# Patient Record
Sex: FEMALE | Employment: PART TIME | URBAN - METROPOLITAN AREA
[De-identification: names, ages, dates, MRNs, and addresses within clinical notes are randomized per-mention and may not be internally consistent; named-entity substitution may affect disease eponyms.]

---

## 2017-06-12 ENCOUNTER — HOSPITAL ENCOUNTER (OUTPATIENT)
Dept: LAB | Facility: CLINIC | Age: 32
Discharge: HOME | End: 2017-06-12
Attending: UROLOGY | Admitting: UROLOGY
Payer: COMMERCIAL

## 2017-06-12 DIAGNOSIS — N39.0 URINARY TRACT INFECTION: Primary | ICD-10-CM

## 2017-06-12 PROCEDURE — 87086 URINE CULTURE/COLONY COUNT: CPT | Performed by: UROLOGY

## 2017-06-13 LAB
BACTERIA SPEC CULT: NORMAL
Lab: NORMAL
MICRO REPORT STATUS: NORMAL
SPECIMEN SOURCE: NORMAL

## 2019-06-11 DIAGNOSIS — O09.291 HX OF POSTPARTUM HEMORRHAGE, CURRENTLY PREGNANT, FIRST TRIMESTER: ICD-10-CM

## 2019-06-11 RX ORDER — HYDROXYPROGESTERONE CAPROATE 250 MG/ML
250 INJECTION INTRAMUSCULAR
Status: CANCELLED
Start: 2019-07-09

## 2019-07-09 ENCOUNTER — INFUSION THERAPY VISIT (OUTPATIENT)
Dept: INFUSION THERAPY | Facility: CLINIC | Age: 34
End: 2019-07-09
Attending: SPECIALIST
Payer: COMMERCIAL

## 2019-07-09 VITALS
HEART RATE: 77 BPM | DIASTOLIC BLOOD PRESSURE: 68 MMHG | RESPIRATION RATE: 18 BRPM | SYSTOLIC BLOOD PRESSURE: 109 MMHG | TEMPERATURE: 98.4 F

## 2019-07-09 DIAGNOSIS — O09.291 HX OF POSTPARTUM HEMORRHAGE, CURRENTLY PREGNANT, FIRST TRIMESTER: Primary | ICD-10-CM

## 2019-07-09 PROCEDURE — 96372 THER/PROPH/DIAG INJ SC/IM: CPT

## 2019-07-09 PROCEDURE — 25000128 H RX IP 250 OP 636: Performed by: SPECIALIST

## 2019-07-09 RX ORDER — HYDROXYPROGESTERONE CAPROATE 250 MG/ML
250 INJECTION INTRAMUSCULAR
Status: DISCONTINUED | OUTPATIENT
Start: 2019-07-09 | End: 2019-07-09 | Stop reason: HOSPADM

## 2019-07-09 RX ORDER — HYDROXYPROGESTERONE CAPROATE 250 MG/ML
250 INJECTION INTRAMUSCULAR
Status: CANCELLED
Start: 2019-07-16

## 2019-07-09 RX ADMIN — HYDROXYPROGESTERONE CAPROATE 250 MG: 250 INJECTION INTRAMUSCULAR at 15:39

## 2019-07-09 ASSESSMENT — PAIN SCALES - GENERAL: PAINLEVEL: NO PAIN (0)

## 2019-07-09 NOTE — PROGRESS NOTES
Infusion Nursing Note:  Brigida Rose presents today for Irma.    Patient seen by provider today: No   present during visit today: Not Applicable.    Note: N/A.    Intravenous Access:  No Intravenous access/labs at this visit.    Treatment Conditions:  Not Applicable.      Post Infusion Assessment:  Patient tolerated injection without incident.       Discharge Plan:   Discharge instructions reviewed with: Patient.  Patient and/or family verbalized understanding of discharge instructions and all questions answered.  Copy of AVS reviewed with patient and/or family.  Patient will return 7/16*/19 for next appointment.  Patient discharged in stable condition accompanied by: self.  Departure Mode: Ambulatory.    Allison Mauricio RN

## 2023-05-18 ENCOUNTER — ANCILLARY PROCEDURE (OUTPATIENT)
Dept: ULTRASOUND IMAGING | Facility: CLINIC | Age: 38
End: 2023-05-18
Attending: SPECIALIST
Payer: COMMERCIAL

## 2023-05-18 DIAGNOSIS — M79.89 SWELLING OF LOWER LEG: ICD-10-CM

## 2023-05-18 PROCEDURE — 93971 EXTREMITY STUDY: CPT | Mod: RT

## 2023-09-20 ENCOUNTER — OFFICE VISIT (OUTPATIENT)
Dept: FAMILY MEDICINE | Facility: CLINIC | Age: 38
End: 2023-09-20

## 2023-09-20 VITALS
OXYGEN SATURATION: 98 % | HEIGHT: 66 IN | WEIGHT: 171 LBS | HEART RATE: 64 BPM | BODY MASS INDEX: 27.48 KG/M2 | DIASTOLIC BLOOD PRESSURE: 64 MMHG | SYSTOLIC BLOOD PRESSURE: 113 MMHG

## 2023-09-20 DIAGNOSIS — M79.89 RIGHT LEG SWELLING: Primary | ICD-10-CM

## 2023-09-20 DIAGNOSIS — J45.20 MILD INTERMITTENT ASTHMA WITHOUT COMPLICATION: ICD-10-CM

## 2023-09-20 PROBLEM — O09.291: Status: RESOLVED | Noted: 2019-06-11 | Resolved: 2023-09-20

## 2023-09-20 PROCEDURE — 99203 OFFICE O/P NEW LOW 30 MIN: CPT | Performed by: FAMILY MEDICINE

## 2023-09-20 RX ORDER — ESCITALOPRAM OXALATE 10 MG/1
TABLET ORAL
COMMUNITY
Start: 2023-09-18

## 2023-09-20 RX ORDER — NORETHINDRONE 0.35 MG/1
TABLET ORAL
COMMUNITY

## 2023-09-20 RX ORDER — ALBUTEROL SULFATE 90 UG/1
AEROSOL, METERED RESPIRATORY (INHALATION)
COMMUNITY
Start: 2023-08-23

## 2023-09-20 NOTE — PATIENT INSTRUCTIONS
[facility]:    Chata Nguyễn MD, MS, DABVLM, [facility]  [facility]  [facility]  [facility]  [facility]  [facility]

## 2023-09-20 NOTE — PROGRESS NOTES
"SUBJECTIVE:    Brigida Rose, is a 38 year old female presenting to [facility] care ( referred by Obgyn) and for the below:     1. Exercise induced asthma. Albuterol PRN. Occasional viral URI symptoms. H/o sinus infections.     2. Right lower extremity swelling. Onset during pregnancy (bed rest from 13 to 20 weeks for subchorionic bleed). Doppler USS right lower extremity at 34 weeks within normal limits. No resolution post partum (13 weeks now). Swelling improves overnight, worsens during the day.  Has tried compression stocking both during and after pregnancy with minimal improvement in symptoms/swelling for a duration over 6 weeks.       OBJECTIVE:  Vitals:    09/20/23 1008   BP: 113/64   Pulse: 64   SpO2: 98%   Weight: 77.6 kg (171 lb)   Height: 1.67 m (5' 5.75\")    Body mass index is 27.81 kg/m .  General: no acute distress, cooperative with exam.  Lungs: clear to auscultation bilaterally, normal respiratory effort.  Heart: regular rate, normal S1 and S2, no murmurs.   Abdomen: non tender, no palpable organomegaly.  Extremities: warm, perfused, trace edema right lower extremity. No obvious superficial tortuous varicosities       ASSESSMENT / PLAN:      Right leg swelling  Suspect related to impeded IVC return during most recent pregnancy with extended bed rest. Referral to vascular medicine for consideration of vascular flow imaging / next steps. Has tried compression stocking with minimal improvement in symptoms/swelling for a duration over 6 weeks.   -     Vascular Medicine Referral; Future    Mild intermittent asthma without complication  Stable.     Last pap: Spring 2022 -  Keven signed   "

## 2023-10-31 ENCOUNTER — TRANSFERRED RECORDS (OUTPATIENT)
Dept: FAMILY MEDICINE | Facility: CLINIC | Age: 38
End: 2023-10-31

## 2023-11-19 ENCOUNTER — HEALTH MAINTENANCE LETTER (OUTPATIENT)
Age: 38
End: 2023-11-19

## 2024-02-01 ENCOUNTER — TELEPHONE (OUTPATIENT)
Dept: FAMILY MEDICINE | Facility: CLINIC | Age: 39
End: 2024-02-01

## 2024-02-01 DIAGNOSIS — Z29.9 PROPHYLACTIC MEASURE: Primary | ICD-10-CM

## 2024-02-01 RX ORDER — OSELTAMIVIR PHOSPHATE 75 MG/1
75 CAPSULE ORAL DAILY
Qty: 7 CAPSULE | Refills: 0 | Status: SHIPPED | OUTPATIENT
Start: 2024-02-01 | End: 2024-02-08

## 2024-02-01 NOTE — TELEPHONE ENCOUNTER
Patient calls clinic and states her  tested positive for Influenza B on Tuesday 1/30/24.  She is wondering if Dr. 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  She has taken it before with no issues, and currently is not symptomatic, other than occasional headaches.  Her children were also prescribed Tamiflu.  She would like rx sent to [facility] in [place].       Routing to Brooklyn Vann MD for review and will call patient back with update.  Debbie Lopez LPN on 2/1/2024 at 10:14 AM

## 2024-05-23 ENCOUNTER — OFFICE VISIT (OUTPATIENT)
Dept: FAMILY MEDICINE | Facility: CLINIC | Age: 39
End: 2024-05-23

## 2024-05-23 VITALS
SYSTOLIC BLOOD PRESSURE: 104 MMHG | DIASTOLIC BLOOD PRESSURE: 78 MMHG | HEIGHT: 67 IN | HEART RATE: 60 BPM | BODY MASS INDEX: 26.78 KG/M2 | OXYGEN SATURATION: 99 %

## 2024-05-23 DIAGNOSIS — J02.9 ACUTE PHARYNGITIS, UNSPECIFIED ETIOLOGY: ICD-10-CM

## 2024-05-23 DIAGNOSIS — J01.40 ACUTE NON-RECURRENT PANSINUSITIS: Primary | ICD-10-CM

## 2024-05-23 LAB — S PYO AG THROAT QL IA.RAPID: NEGATIVE

## 2024-05-23 PROCEDURE — 99213 OFFICE O/P EST LOW 20 MIN: CPT

## 2024-05-23 PROCEDURE — 87880 STREP A ASSAY W/OPTIC: CPT

## 2024-05-23 PROCEDURE — G2211 COMPLEX E/M VISIT ADD ON: HCPCS

## 2024-05-23 RX ORDER — BENZONATATE 100 MG/1
100 CAPSULE ORAL 3 TIMES DAILY PRN
Qty: 21 CAPSULE | Refills: 0 | Status: CANCELLED | OUTPATIENT
Start: 2024-05-23

## 2024-05-23 ASSESSMENT — ANXIETY QUESTIONNAIRES
5. BEING SO RESTLESS THAT IT IS HARD TO SIT STILL: NOT AT ALL
1. FEELING NERVOUS, ANXIOUS, OR ON EDGE: SEVERAL DAYS
GAD7 TOTAL SCORE: 4
6. BECOMING EASILY ANNOYED OR IRRITABLE: MORE THAN HALF THE DAYS
IF YOU CHECKED OFF ANY PROBLEMS ON THIS QUESTIONNAIRE, HOW DIFFICULT HAVE THESE PROBLEMS MADE IT FOR YOU TO DO YOUR WORK, TAKE CARE OF THINGS AT HOME, OR GET ALONG WITH OTHER PEOPLE: SOMEWHAT DIFFICULT
GAD7 TOTAL SCORE: 4
2. NOT BEING ABLE TO STOP OR CONTROL WORRYING: NOT AT ALL
3. WORRYING TOO MUCH ABOUT DIFFERENT THINGS: SEVERAL DAYS
7. FEELING AFRAID AS IF SOMETHING AWFUL MIGHT HAPPEN: NOT AT ALL

## 2024-05-23 ASSESSMENT — PATIENT HEALTH QUESTIONNAIRE - PHQ9
5. POOR APPETITE OR OVEREATING: NOT AT ALL
SUM OF ALL RESPONSES TO PHQ QUESTIONS 1-9: 3

## 2024-05-23 NOTE — PROGRESS NOTES
"  Assessment & Plan     Acute non-recurrent pansinusitis  Discussed that symptoms are likely viral etiology at this time and lack of current indication for Abx discussed.  After discussion, patient preference is for delayed Abx script : will collect if symptoms persist without improvement over the next 48-72 hours. Rx for Augmentin.   Discussed main side effects from antibiotics can be GI upset, loose stools, etc   Recommend taking with food to help reduce GI complications from antibiotic use.  Call if any serious diarrhea develops within the next several weeks of taking this antibiotics.   Recommended symptomatic management such as steam inhalation, steroid nasal spray for next few weeks, OTC analgesics, and/or nasal/sinus lavage with Netti pot or Sinus Rinse Kit. Follow up if symptoms worsen or failure to improve. Patient agreeable to plan   - amoxicillin-clavulanate (AUGMENTIN) 875-125 MG tablet  Dispense: 14 tablet; Refill: 0    Acute pharyngitis, unspecified etiology  Rapid strep was negative. No culture sent. Sore throat recommendations include salt water gargles, OTC throat lozenges, tea/tea with honey and Tylenol/Ibuprofen as needed for pain/fever. Follow up for new or worsening symptoms. Patient agreeable with plan. All questions answered.  - Rapid Strep ([facility])            BMI  Estimated body mass index is 26.78 kg/m  as calculated from the following:    Height as of this encounter: 1.702 m (5' 7\").    Weight as of 9/20/23: 77.6 kg (171 lb).   Weight management plan: Discussed healthy diet and exercise guidelines      Work on weight loss  Regular exercise  See Patient Instructions    Return if symptoms worsen or fail to improve, for Follow up.    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 More PND  Intensity: moderate  Progression of Symptoms:  improving sore throat, but overall worse with coughing  Accompanying Signs & Symptoms: Sore throat, congestion/runny nose, headache, cough  Previous history of similar problem: Been a couple years but prone to bronchitis   Precipitating factors:        Worsened by: talking  Alleviating factors:        Improved by: allergy medication, steamy shower  Therapies tried and outcome: Flonase and allergy medication (zyrtec)  Exposure to strep throat       Review of Systems  Constitutional, HEENT, cardiovascular, pulmonary, gi and gu systems are negative, except as otherwise noted.      Objective    /78   Pulse 60   Ht 1.702 m (5' 7\")   SpO2 99%   BMI 26.78 kg/m    Body mass index is 26.78 kg/m .  Physical Exam   GENERAL: alert and no distress  HENT: normal cephalic/atraumatic, ear canals and TM's normal, nose and mouth without ulcers or lesions, nasal mucosa edematous , oropharynx clear, oral mucous membranes moist, tonsillar erythema, and sinuses: maxillary, frontal tenderness on both sides  NECK: no adenopathy  RESP: lungs clear to auscultation - no rales, rhonchi or wheezes  CV: regular rate and rhythm, normal S1 S2, no S3 or S4, no murmur, click or rub, no peripheral edema  PSYCH: mentation appears normal, affect normal/bright    Results for orders placed or performed in visit on 05/23/24 (from the past 24 hour(s))   Rapid Strep ([facility])   Result Value Ref Range    Rapid Strep A Screen Negative            Signed Electronically by: Laney Jaimes, RENNY CNP    "

## 2024-06-19 ENCOUNTER — OFFICE VISIT (OUTPATIENT)
Dept: FAMILY MEDICINE | Facility: CLINIC | Age: 39
End: 2024-06-19

## 2024-06-19 VITALS
HEIGHT: 66 IN | DIASTOLIC BLOOD PRESSURE: 79 MMHG | OXYGEN SATURATION: 100 % | WEIGHT: 147 LBS | BODY MASS INDEX: 23.63 KG/M2 | HEART RATE: 57 BPM | SYSTOLIC BLOOD PRESSURE: 128 MMHG

## 2024-06-19 DIAGNOSIS — A04.72 CLOSTRIDIUM DIFFICILE COLITIS: Primary | ICD-10-CM

## 2024-06-19 PROCEDURE — 99213 OFFICE O/P EST LOW 20 MIN: CPT | Performed by: FAMILY MEDICINE

## 2024-06-19 PROCEDURE — G2211 COMPLEX E/M VISIT ADD ON: HCPCS | Performed by: FAMILY MEDICINE

## 2024-06-19 RX ORDER — CRANBERRY FRUIT EXTRACT 250 MG
CAPSULE ORAL
COMMUNITY

## 2024-06-19 RX ORDER — VANCOMYCIN HYDROCHLORIDE 125 MG/1
125 CAPSULE ORAL 4 TIMES DAILY
Qty: 40 CAPSULE | Refills: 0 | Status: SHIPPED | OUTPATIENT
Start: 2024-06-19 | End: 2024-06-29

## 2024-06-19 NOTE — PROGRESS NOTES
"SUBJECTIVE:    Brigida Rose, is a 38 year old female presenting for the below:     1. Household contact (4 year old daughter) with C diff colitis :diagnosed 2 weeks ago: Daughters symptoms started 5/26 : now on Abx treatment.   Patient recently completed Abx course for URI : started 5/26. 3 loose stools (some watery) this am with mild abdominal discomfort.  Denies decreased appetite, nausea, vomiting. No fevers or chills.     OBJECTIVE:  Vitals:    06/19/24 1021   BP: 128/79   Pulse: 57   SpO2: 100%   Weight: 66.7 kg (147 lb)   Height: 1.676 m (5' 6\")    Body mass index is 23.73 kg/m .  General: no acute distress, cooperative with exam.  Psych: mental status normal, mood and affect appropriate.      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   Can stop vanco if stool culture negative.   Of note, patient breastfeeding : reviewed minimal amounts in breast milk as vancomycin not absorbed through GI tract  -     C. difficile Toxin B PCR with reflex to C. difficile Antigen and Toxins A/B EIA; Future  -     vancomycin (VANCOCIN) 125 MG capsule; Take 1 capsule (125 mg) by mouth 4 times daily for 10 days        "

## 2024-06-21 ENCOUNTER — MYC MEDICAL ADVICE (OUTPATIENT)
Dept: FAMILY MEDICINE | Facility: CLINIC | Age: 39
End: 2024-06-21

## 2024-06-21 DIAGNOSIS — A04.72 CLOSTRIDIUM DIFFICILE COLITIS: ICD-10-CM

## 2024-06-21 PROCEDURE — 87493 C DIFF AMPLIFIED PROBE: CPT | Mod: ORL | Performed by: FAMILY MEDICINE

## 2024-06-22 LAB — C DIFF TOX B STL QL: NEGATIVE

## 2024-06-28 ENCOUNTER — TRANSFERRED RECORDS (OUTPATIENT)
Dept: FAMILY MEDICINE | Facility: CLINIC | Age: 39
End: 2024-06-28

## 2024-06-28 LAB
CHOLESTEROL (EXTERNAL): 192 MG/DL (ref 0–199)
HDLC SERPL-MCNC: 89 MG/DL (ref 40–99)
LDL CHOLESTEROL CALCULATED (EXTERNAL): 95 MG/DL (ref 0–130)
NON HDL CHOLESTEROL (EXTERNAL): 0 MG/DL (ref 0–?)
TRIGLYCERIDES (EXTERNAL): 42 MG/DL (ref 0–150)

## 2024-07-25 ENCOUNTER — VIRTUAL VISIT (OUTPATIENT)
Dept: FAMILY MEDICINE | Facility: CLINIC | Age: 39
End: 2024-07-25

## 2024-07-25 DIAGNOSIS — J01.40 ACUTE NON-RECURRENT PANSINUSITIS: Primary | ICD-10-CM

## 2024-07-25 PROCEDURE — 99213 OFFICE O/P EST LOW 20 MIN: CPT | Mod: 95

## 2024-07-25 RX ORDER — TRANEXAMIC ACID 650 MG/1
650 TABLET ORAL DAILY
COMMUNITY
Start: 2024-06-28

## 2024-07-25 RX ORDER — AMOXICILLIN 875 MG
875 TABLET ORAL 2 TIMES DAILY
Qty: 14 TABLET | Refills: 0 | Status: SHIPPED | OUTPATIENT
Start: 2024-07-25 | End: 2024-08-01

## 2024-07-25 NOTE — PROGRESS NOTES
Brigida is a 38 year old who is being evaluated via a billable video visit.    How would you like to obtain your AVS? MyChart  If the video visit is dropped, the invitation should be resent by: Text to cell phone: [phone]  Will anyone else be joining your video visit? No      Assessment & Plan     Acute non-recurrent pansinusitis  - Education about adverse effects of prescription medication discussed  - Red flags that warrant emergent evaluation discussed  - Patient verbalized understanding and is agreeable to the discussed plan of care.    - symptom cares discussed       See Patient Instructions    No follow-ups on file.    Subjective   Brigida is a 38 year old, presenting for the following health issues:  URI (Bad cold starting 07/06 with nasal congestion, sore throat, and cough. Symptoms lasted 5-6 days then started to resolve. 4 days ago sx all came back./Has done 2 home covid tests, both negative/Denies fever, SOB, chest pain)    HPI     1.) sxs onset of 7/6 with nasal congestion, sore throat, and cough that lasted 5-6 days, sxs somewhat resolved but never fulling and 4 days ago worsened again. Had exposure to illness from family members 7/4. Congestion has persisted. From sinusitis issues in the past - has had epistaxis.   Has had 2 negative COVID tests. Has not had to increase use of inhaler this past week but did require use x2 a couple weeks.         Review of Systems  Constitutional, HEENT, cardiovascular, pulmonary, gi and gu systems are negative, except as otherwise noted.      Objective           Vitals:  No vitals were obtained today due to virtual visit.    Physical Exam   GENERAL: alert and no distress  EYES: Eyes grossly normal to inspection.  No discharge or erythema, or obvious scleral/conjunctival abnormalities.  RESP: No audible wheeze, cough, or visible cyanosis.    SKIN: Visible skin clear. No significant rash, abnormal pigmentation or lesions.  NEURO: Cranial nerves grossly intact.   Mentation and speech appropriate for age.  PSYCH: Appropriate affect, tone, and pace of words    No results found for this or any previous visit (from the past 24 hour(s)).      Video-Visit Details    Type of service:  Video Visit   Originating Location (pt. Location): Home    Distant Location (provider location):  On-site  Platform used for Video Visit: Mary  Signed Electronically by: RENNY Rosado CNP

## 2024-09-06 ENCOUNTER — TRANSFERRED RECORDS (OUTPATIENT)
Dept: FAMILY MEDICINE | Facility: CLINIC | Age: 39
End: 2024-09-06

## 2024-10-24 ENCOUNTER — OFFICE VISIT (OUTPATIENT)
Dept: FAMILY MEDICINE | Facility: CLINIC | Age: 39
End: 2024-10-24

## 2024-10-24 VITALS
WEIGHT: 151 LBS | TEMPERATURE: 97.9 F | BODY MASS INDEX: 24.37 KG/M2 | DIASTOLIC BLOOD PRESSURE: 78 MMHG | OXYGEN SATURATION: 99 % | SYSTOLIC BLOOD PRESSURE: 119 MMHG | HEART RATE: 73 BPM

## 2024-10-24 DIAGNOSIS — J01.40 ACUTE NON-RECURRENT PANSINUSITIS: Primary | ICD-10-CM

## 2024-10-24 PROCEDURE — G2211 COMPLEX E/M VISIT ADD ON: HCPCS

## 2024-10-24 PROCEDURE — 99213 OFFICE O/P EST LOW 20 MIN: CPT

## 2024-10-24 NOTE — PROGRESS NOTES
Assessment & Plan     Acute non-recurrent pansinusitis  S/s consistent with a sinus infection. Rx for Augmentin.   Discussed main side effects from antibiotics can be GI upset, loose stools, etc   Recommend taking with food to help reduce GI complications from antibiotic use.  Call if any serious diarrhea develops within the next several weeks of taking this antibiotics.   Recommended symptomatic cares such as expectorants, steroid nasal spray for next few weeks, over the counter anti-inflammatory medications, and/or nasal/sinus lavage with Netti pot or Sinus Rinse Kit. Decongestant nasal sprays can be used, but use with caution and not for more than 3 days. Follow up as needed for new or worsening symptoms or if symptoms fail to improve. Patient agreeable to plan. All questions answered.   - amoxicillin-clavulanate (AUGMENTIN) 875-125 MG tablet  Dispense: 14 tablet; Refill: 0              See Patient Instructions    Return if symptoms worsen or fail to improve, for Follow up.    Amber Allred is a 39 year old, presenting for the following health issues:  Cough (Ongoing cough for 8-9 days sometimes dry sometimes productive, nasal congestion, sinus pressure, fatigue, dark yellow mucus /Tested negative for Covid today/)    HPI       Concern - ough  Onset: over 1 week, 9 days  Description: started with dry deep cough. 4 kids-son congestion, cough. She got just the cough. Mucinex helped get some mucus out. Never went away. More congestion. Monday constant tickle in her throat. Thought getting better but then couldn't stop coughing. Very reactive. Last couple nights more congestion, but not as bad today.   Intensity: mild to moderate frontal sinus  Progression of Symptoms:  waxing and waning  Accompanying Signs & Symptoms: cough, congestion, facial pain and light headedness-sinus pressure.   Previous history of similar problem: gets sinus infections more lately: 2-3 this year  Precipitating factors:         Worsened by: Work on Monday   Alleviating factors:        Improved by: Mucinex a little   Therapies tried and outcome: Mucinex, Flonase, Zyrtec        Review of Systems  Constitutional, HEENT, cardiovascular, pulmonary, gi and gu systems are negative, except as otherwise noted.      Objective    /78   Pulse 73   Temp 97.9  F (36.6  C)   Wt 68.5 kg (151 lb)   SpO2 99%   BMI 24.37 kg/m    Body mass index is 24.37 kg/m .  Physical Exam   GENERAL: alert and no distress  HENT: normal cephalic/atraumatic, ear canals and TM's normal, nose and mouth without ulcers or lesions, nasal mucosa edematous , rhinorrhea clear, oropharynx clear, oral mucous membranes moist, and sinuses: maxillary, ethmoid tenderness on both sides  NECK: no adenopathy  RESP: lungs clear to auscultation - no rales, rhonchi or wheezes  CV: regular rate and rhythm, normal S1 S2, no S3 or S4, no murmur, click or rub, no peripheral edema  PSYCH: mentation appears normal, affect normal/bright          Signed Electronically by: Laney Jaimes, RENNY CNP

## 2024-10-30 ENCOUNTER — TRANSFERRED RECORDS (OUTPATIENT)
Dept: FAMILY MEDICINE | Facility: CLINIC | Age: 39
End: 2024-10-30

## 2024-11-01 ENCOUNTER — OFFICE VISIT (OUTPATIENT)
Dept: FAMILY MEDICINE | Facility: CLINIC | Age: 39
End: 2024-11-01

## 2024-11-01 VITALS
BODY MASS INDEX: 24.37 KG/M2 | OXYGEN SATURATION: 100 % | HEART RATE: 54 BPM | DIASTOLIC BLOOD PRESSURE: 56 MMHG | WEIGHT: 151 LBS | SYSTOLIC BLOOD PRESSURE: 136 MMHG

## 2024-11-01 DIAGNOSIS — J45.20 MILD INTERMITTENT ASTHMA WITHOUT COMPLICATION: ICD-10-CM

## 2024-11-01 DIAGNOSIS — R00.2 PALPITATIONS: Primary | ICD-10-CM

## 2024-11-01 DIAGNOSIS — N92.0 MENORRHAGIA WITH REGULAR CYCLE: ICD-10-CM

## 2024-11-01 DIAGNOSIS — Z82.49 FAMILY HISTORY OF ISCHEMIC HEART DISEASE: ICD-10-CM

## 2024-11-01 LAB
% GRANULOCYTES: 54.4 % (ref 42.2–75.2)
HCT VFR BLD AUTO: 39.5 % (ref 35–46)
HEMOGLOBIN: 13.4 G/DL (ref 11.8–15.5)
LYMPHOCYTES NFR BLD AUTO: 37.9 % (ref 20.5–51.1)
MCH RBC QN AUTO: 28.6 PG (ref 27–31)
MCHC RBC AUTO-ENTMCNC: 34 G/DL (ref 33–37)
MCV RBC AUTO: 84.2 FL (ref 80–100)
MONOCYTES NFR BLD AUTO: 7.7 % (ref 1.7–9.3)
PLATELET # BLD AUTO: 239 K/UL (ref 140–450)
RBC # BLD AUTO: 4.69 X10/CMM (ref 3.7–5.2)
TSH SERPL DL<=0.005 MIU/L-ACNC: 1.9 UIU/ML (ref 0.3–4.2)
WBC # BLD AUTO: 5.1 X10/CMM (ref 3.8–11)

## 2024-11-01 PROCEDURE — 90480 ADMN SARSCOV2 VAC 1/ONLY CMP: CPT

## 2024-11-01 PROCEDURE — 93242 EXT ECG>48HR<7D RECORDING: CPT

## 2024-11-01 PROCEDURE — 85025 COMPLETE CBC W/AUTO DIFF WBC: CPT

## 2024-11-01 PROCEDURE — 84443 ASSAY THYROID STIM HORMONE: CPT | Mod: ORL

## 2024-11-01 PROCEDURE — 36415 COLL VENOUS BLD VENIPUNCTURE: CPT

## 2024-11-01 PROCEDURE — 83695 ASSAY OF LIPOPROTEIN(A): CPT | Mod: ORL

## 2024-11-01 PROCEDURE — 99214 OFFICE O/P EST MOD 30 MIN: CPT | Mod: 25

## 2024-11-01 PROCEDURE — 93000 ELECTROCARDIOGRAM COMPLETE: CPT

## 2024-11-01 PROCEDURE — 91320 SARSCV2 VAC 30MCG TRS-SUC IM: CPT

## 2024-11-01 NOTE — PROGRESS NOTES
Assessment & Plan     Palpitations  - bardycam applied for 3 days with echo early next week, no red flag symptoms on exam but somewhat high PVC burden, patient to remain well hydrated with 60 fl oz of water per day  - Red flags that warrant emergent evaluation discussed, also listed in AVS  - Patient verbalized understanding and is agreeable to the discussed plan of care.    - TSH  - CBC with Diff/Plt ([facility])  - VENOUS COLLECTION  - TSH  - Echocardiogram Complete  - SD EXT ECG > 48HR TO 7 DAY REVIEW AND INTERPRETATN  - SD EXT ECG > 48HR TO 7 DAY RCRD, INTL RCRD    Mild intermittent asthma without complication  - stable and rare use of albuterol   - VENOUS COLLECTION    Menorrhagia with regular cycle  - stable on tranexamic acid - but given history will check CBC to see if contributing to PVC burden  - VENOUS COLLECTION    Family history of ischemic heart disease  - given family history of MI will check lipoprotein a   - Lipoprotein (a)              Work on weight loss  Regular exercise  See Patient Instructions    No follow-ups on file.    Amber Allred is a 39 year old, presenting for the following health issues:  Palpitations (PVCs found during colonoscopy on 10/30. Does get some SOB and palpitations. Has has several episodes of bradycardia below 40bpm/Denies crushing chest pains/Maternal grandfather passed from heart attack in his 60s, Father has cardiac issues)    HPI     1.) PVCs defined as frequent at recent colonoscopy with some bradycardia.  She did propofol have sedation.  She has had SOB sometimes after a palpitation and palpitations when at random - notices more with stress, anxious, or sometimes with exercise.  She was not aware of what this was so not quite keeping track of when or how often it occurs.  She does drink 2 cups of coffee or alcohol.  She denies family history of thyroid issues.  Taking tranexamic acid due to menorrhagia.  Hx of asthma and anxiety - rare use of albuterol usually with  viral or URI illness.  Over the past year had to avoid dairy and soy due to allergy of her youngest - breast feeding.        Review of Systems  Constitutional, HEENT, cardiovascular, pulmonary, gi and gu systems are negative, except as otherwise noted.      Objective    /56   Pulse 54   Wt 68.5 kg (151 lb)   SpO2 100%   BMI 24.37 kg/m    Body mass index is 24.37 kg/m .  Physical Exam   GENERAL: alert and no distress  RESP: lungs clear to auscultation - no rales, rhonchi or wheezes  CV: somewhat frequent PVCs and a pause, bradycardia, normal S1 S2, no S3 or S4, no murmur, click or rub, peripheral pulses strong, no peripheral edema  MS: no gross musculoskeletal defects noted, no edema  PSYCH: mentation appears normal, affect normal/bright    Results for orders placed or performed in visit on 11/01/24 (from the past 24 hours)   CBC with Diff/Plt ([facility])   Result Value Ref Range    WBC x10/cmm 5.1 3.8 - 11.0 x10/cmm    % Lymphocytes 37.9 20.5 - 51.1 %    % Monocytes 7.7 1.7 - 9.3 %    % Granulocytes 54.4 42.2 - 75.2 %    RBC x10/cmm 4.69 3.7 - 5.2 x10/cmm    Hemoglobin 13.4 11.8 - 15.5 g/dl    Hematocrit 39.5 35 - 46 %    MCV 84.2 80 - 100 fL    MCH 28.6 27.0 - 31.0 pg    MCHC 34.0 33.0 - 37.0 g/dL    Platelet Count 239 140 - 450 K/uL           Signed Electronically by: RENNY Rosado CNP    The longitudinal plan of care for the diagnosis(es)/condition(s) as documented were addressed during this visit. Due to the added complexity in care, I will continue to support Brigida in the subsequent management and with ongoing continuity of care.   89M p/w 3d of "gasping for air"

## 2024-11-01 NOTE — PATIENT INSTRUCTIONS
Persistent crushing chest pain or dizziness, worsening SOB, numbness or tingling of left arm, diaphoresis, nausea/vomiting - GO TO EMERGENCY ROOM

## 2024-11-02 LAB — APO A-I SERPL-MCNC: 49 MG/DL

## 2024-11-04 ENCOUNTER — MYC MEDICAL ADVICE (OUTPATIENT)
Dept: FAMILY MEDICINE | Facility: CLINIC | Age: 39
End: 2024-11-04

## 2024-11-04 DIAGNOSIS — R00.2 PALPITATIONS: Primary | ICD-10-CM

## 2024-11-06 ENCOUNTER — HOSPITAL ENCOUNTER (OUTPATIENT)
Dept: CARDIOLOGY | Facility: CLINIC | Age: 39
Discharge: HOME | End: 2024-11-06
Payer: COMMERCIAL

## 2024-11-06 LAB — LVEF ECHO: NORMAL

## 2024-11-06 PROCEDURE — 93306 TTE W/DOPPLER COMPLETE: CPT

## 2024-11-26 ENCOUNTER — OFFICE VISIT (OUTPATIENT)
Dept: FAMILY MEDICINE | Facility: CLINIC | Age: 39
End: 2024-11-26

## 2024-11-26 VITALS
DIASTOLIC BLOOD PRESSURE: 84 MMHG | HEART RATE: 68 BPM | OXYGEN SATURATION: 98 % | BODY MASS INDEX: 24.5 KG/M2 | WEIGHT: 151.8 LBS | SYSTOLIC BLOOD PRESSURE: 116 MMHG

## 2024-11-26 DIAGNOSIS — R07.89 ATYPICAL CHEST PAIN: Primary | ICD-10-CM

## 2024-11-26 DIAGNOSIS — I49.3 PVC'S (PREMATURE VENTRICULAR CONTRACTIONS): ICD-10-CM

## 2024-11-26 PROCEDURE — G2211 COMPLEX E/M VISIT ADD ON: HCPCS | Performed by: FAMILY MEDICINE

## 2024-11-26 PROCEDURE — 99213 OFFICE O/P EST LOW 20 MIN: CPT | Performed by: FAMILY MEDICINE

## 2024-11-26 ASSESSMENT — ASTHMA QUESTIONNAIRES
QUESTION_5 LAST FOUR WEEKS HOW WOULD YOU RATE YOUR ASTHMA CONTROL: COMPLETELY CONTROLLED
QUESTION_2 LAST FOUR WEEKS HOW OFTEN HAVE YOU HAD SHORTNESS OF BREATH: ONCE OR TWICE A WEEK
ACT_TOTALSCORE: 24
ACT_TOTALSCORE: 24
QUESTION_3 LAST FOUR WEEKS HOW OFTEN DID YOUR ASTHMA SYMPTOMS (WHEEZING, COUGHING, SHORTNESS OF BREATH, CHEST TIGHTNESS OR PAIN) WAKE YOU UP AT NIGHT OR EARLIER THAN USUAL IN THE MORNING: NOT AT ALL
QUESTION_4 LAST FOUR WEEKS HOW OFTEN HAVE YOU USED YOUR RESCUE INHALER OR NEBULIZER MEDICATION (SUCH AS ALBUTEROL): NOT AT ALL
QUESTION_1 LAST FOUR WEEKS HOW MUCH OF THE TIME DID YOUR ASTHMA KEEP YOU FROM GETTING AS MUCH DONE AT WORK, SCHOOL OR AT HOME: NONE OF THE TIME

## 2024-11-26 ASSESSMENT — PATIENT HEALTH QUESTIONNAIRE - PHQ9: SUM OF ALL RESPONSES TO PHQ QUESTIONS 1-9: 1

## 2024-11-26 NOTE — PROGRESS NOTES
SUBJECTIVE:    Brigida Rose, is a 39 year old female presenting for the below:     Ongoing intermittent palpitations : demonstrated as PVCs on recent wearable monitoring. Now with development of right sided chest pressure / pinching with physical activity in last week (cardio and strength training). Radiating to back. No associated shortness of breath, diaphoresis. Of note, did not do any strenuous exercise while wearing bardycam.     OBJECTIVE:  Vitals:    11/26/24 1115   BP: 116/84   Pulse: 68   SpO2: 98%   Weight: 68.9 kg (151 lb 12.8 oz)    Body mass index is 24.5 kg/m .  General: no acute distress, cooperative with exam.  Psych: mental status normal, mood and affect appropriate.    ASSESSMENT / PLAN:      Atypical chest pain  PVC's (premature ventricular contractions)  Proceed to stress EKG and echo to exclude tachyrhythmia with resultant reduced myocardial perfusion during physical excursion. Patient expressed understanding and agreement with the plan.  Red flag symptoms that should prompt emergent evaluation discussed and understood.    -     Echocardiogram Exercise Stress; Future      The longitudinal plan of care for the diagnosis(es)/condition(s) as documented were addressed during this visit. Due to the added complexity in care, I will continue to support Brigida in the subsequent management and with ongoing continuity of care.

## 2024-12-29 ENCOUNTER — HEALTH MAINTENANCE LETTER (OUTPATIENT)
Age: 39
End: 2024-12-29

## 2025-01-14 ENCOUNTER — MYC MEDICAL ADVICE (OUTPATIENT)
Dept: FAMILY MEDICINE | Facility: CLINIC | Age: 40
End: 2025-01-14

## 2025-01-14 DIAGNOSIS — Z20.828 EXPOSURE TO THE FLU: ICD-10-CM

## 2025-01-14 DIAGNOSIS — Z20.828 EXPOSURE TO INFLUENZA: Primary | ICD-10-CM

## 2025-01-14 RX ORDER — OSELTAMIVIR PHOSPHATE 75 MG/1
75 CAPSULE ORAL DAILY
Qty: 7 CAPSULE | Refills: 0 | Status: SHIPPED | OUTPATIENT
Start: 2025-01-14 | End: 2025-01-21

## 2025-03-04 ENCOUNTER — OFFICE VISIT (OUTPATIENT)
Dept: FAMILY MEDICINE | Facility: CLINIC | Age: 40
End: 2025-03-04

## 2025-03-04 VITALS
OXYGEN SATURATION: 99 % | BODY MASS INDEX: 25.13 KG/M2 | SYSTOLIC BLOOD PRESSURE: 104 MMHG | HEART RATE: 65 BPM | DIASTOLIC BLOOD PRESSURE: 81 MMHG | WEIGHT: 156.4 LBS | HEIGHT: 66 IN

## 2025-03-04 DIAGNOSIS — R07.9 CHEST PAIN, UNSPECIFIED TYPE: Primary | ICD-10-CM

## 2025-03-04 DIAGNOSIS — Z20.818 EXPOSURE TO STREP THROAT: ICD-10-CM

## 2025-03-04 LAB
D DIMER PPP FEU-MCNC: <0.27 UG/ML FEU (ref 0–0.5)
S PYO AG THROAT QL IA.RAPID: NEGATIVE
TROPONIN T SERPL HS-MCNC: 10 NG/L

## 2025-03-04 PROCEDURE — 3074F SYST BP LT 130 MM HG: CPT | Performed by: FAMILY MEDICINE

## 2025-03-04 PROCEDURE — 87651 STREP A DNA AMP PROBE: CPT | Mod: 90 | Performed by: FAMILY MEDICINE

## 2025-03-04 PROCEDURE — 84484 ASSAY OF TROPONIN QUANT: CPT | Mod: ORL | Performed by: FAMILY MEDICINE

## 2025-03-04 PROCEDURE — G2211 COMPLEX E/M VISIT ADD ON: HCPCS | Performed by: FAMILY MEDICINE

## 2025-03-04 PROCEDURE — 36415 COLL VENOUS BLD VENIPUNCTURE: CPT | Performed by: FAMILY MEDICINE

## 2025-03-04 PROCEDURE — 3079F DIAST BP 80-89 MM HG: CPT | Performed by: FAMILY MEDICINE

## 2025-03-04 PROCEDURE — 99214 OFFICE O/P EST MOD 30 MIN: CPT | Performed by: FAMILY MEDICINE

## 2025-03-04 PROCEDURE — 85379 FIBRIN DEGRADATION QUANT: CPT | Mod: ORL | Performed by: FAMILY MEDICINE

## 2025-03-04 PROCEDURE — 93000 ELECTROCARDIOGRAM COMPLETE: CPT | Performed by: FAMILY MEDICINE

## 2025-03-04 RX ORDER — CLINDAMYCIN PHOSPHATE 10 UG/ML
LOTION TOPICAL 2 TIMES DAILY
COMMUNITY
Start: 2024-12-02

## 2025-03-04 NOTE — PROGRESS NOTES
"SUBJECTIVE:    Brigida Rose, is a 39 year old female presenting for the below:     General Concern (Submitted on 3/4/2025)  Chief Complaint: Chronic problems general questions HPI Form  What is the reason for your visit today?: chest pain and lightheadedness  When did your symptoms begin?: Today  What are your symptoms?: chest pain,lighthededness, tingling in feet  How would you describe these symptoms?: Moderate  Are your symptoms:: Staying the same  Have you had these symptoms before?: No  Is there anything that makes you feel worse?: movement and taking deep breaths  Is there anything that makes you feel better?: sitting and rest  Questionnaire about: Chronic problems general questions HPI Form (Submitted on 3/4/2025)  Chief Complaint: Chronic problems general questions HPI Form    Eary this am. Woke with left sided chest pain. Squeezing and tightness. 5/10. Radiates to shoulder. Able to get back to sleep, then woke with alarm with continued pain. Squeezing sensation on deep inspiration. Has fluctuated since. Triggered by movement, walking around. Mild shortness of breath associated. Not eased by ibuprofen or KULWINDER inhaler. No palpitations.     Mildly nauseated this am, settled with eating breakfast. Johnathon leg swelling or redness.     Non smoker. Not taking estrogen.     Exposed to Influenza B, and strep in her kids. Asymptomatic with no viral  URI symptoms or sore throat. Requests strep swab to check if she is asymptotically infected.     OBJECTIVE:  Vitals:    03/04/25 1415   BP: 104/81   Pulse: 65   SpO2: 99%   Weight: 70.9 kg (156 lb 6.4 oz)   Height: 1.676 m (5' 6\")    Body mass index is 25.24 kg/m .  General: no acute distress, cooperative with exam.  Lungs: clear to auscultation bilaterally, normal respiratory effort.  Heart: regular rate, normal S1 and S2, no murmurs.   Extremities: warm, perfused, no swelling or edema.    EKG: appears normal, NSR, normal axis, normal intervals, no acute ST/T changes " c/w ischemia, no LVH by voltage criteria    TTE 11/6/2024 : Normal left ventricular size, wall thickness, and systolic function. Estimated  ejection fraction is 55%.  Normal right ventricular size and systolic function.  Estimated right ventricular systolic pressure is 24 mmHg.  No significant valve disease.    ASSESSMENT / PLAN:        Chest pain, unspecified type  EKG with no evidence of ACS. Hemodynamically stable.   -for completion will rule out ACS and PE with stat labs as below (low likelyhood)  -discussed need to proceed to CTPA if d dimer positive : Patient expressed understanding and agreement with the plan.  Red flag symptoms that should prompt emergent evaluation discussed and understood.  -     EKG 12-lead complete w/read - Clinics  -     D dimer quantitative; Future  -     Troponin T, High Sensitivity; Future    Exposure to strep throat  Discussed lack of clinical rational for swabbing for GAS in asymptomatic patient and difficult with interpretation . If positive will indicated carrier status rather than active infection. Discussed the need to avoid unnecessary Abx. Travelling to [facility] in [place] this weekend. Wishes to proceed with swab.   -     Rapid Strep ([facility])    Follow up:  Appointments in Next Year      Mar 04, 2025 2:15 PM  (Arrive by 2:00 PM)  SHORT with Brooklyn Vann MD  [facility] ([facility]) [phone]       The longitudinal plan of care for the diagnosis(es)/condition(s) as documented were addressed during this visit. Due to the added complexity in care, I will continue to support Brigida in the subsequent management and with ongoing continuity of care.

## 2025-07-14 ENCOUNTER — TRANSFERRED RECORDS (OUTPATIENT)
Dept: FAMILY MEDICINE | Facility: CLINIC | Age: 40
End: 2025-07-14

## 2025-08-25 ENCOUNTER — OFFICE VISIT (OUTPATIENT)
Dept: FAMILY MEDICINE | Facility: CLINIC | Age: 40
End: 2025-08-25

## 2025-08-25 VITALS
WEIGHT: 162.4 LBS | SYSTOLIC BLOOD PRESSURE: 116 MMHG | TEMPERATURE: 99.1 F | BODY MASS INDEX: 26.21 KG/M2 | DIASTOLIC BLOOD PRESSURE: 74 MMHG | OXYGEN SATURATION: 100 % | HEART RATE: 78 BPM

## 2025-08-25 DIAGNOSIS — J02.9 ACUTE PHARYNGITIS, UNSPECIFIED ETIOLOGY: Primary | ICD-10-CM

## 2025-08-25 LAB
INFLUENZA A (RMG): NEGATIVE
INFLUENZA B (RMG): NEGATIVE
S PYO AG THROAT QL IA.RAPID: NEGATIVE
SARS ANTIGEN (RMG): NEGATIVE

## 2025-08-25 PROCEDURE — 3074F SYST BP LT 130 MM HG: CPT | Performed by: FAMILY MEDICINE

## 2025-08-25 PROCEDURE — G2211 COMPLEX E/M VISIT ADD ON: HCPCS | Performed by: FAMILY MEDICINE

## 2025-08-25 PROCEDURE — 87651 STREP A DNA AMP PROBE: CPT | Performed by: FAMILY MEDICINE

## 2025-08-25 PROCEDURE — 87428 SARSCOV & INF VIR A&B AG IA: CPT | Performed by: FAMILY MEDICINE

## 2025-08-25 PROCEDURE — 3078F DIAST BP <80 MM HG: CPT | Performed by: FAMILY MEDICINE

## 2025-08-25 PROCEDURE — 99213 OFFICE O/P EST LOW 20 MIN: CPT | Performed by: FAMILY MEDICINE

## 2025-08-25 RX ORDER — BUPROPION HYDROCHLORIDE 150 MG/1
1 TABLET ORAL DAILY
COMMUNITY
Start: 2025-07-11

## 2025-08-25 RX ORDER — RUXOLITINIB 15 MG/G
CREAM TOPICAL
COMMUNITY
Start: 2025-04-28